# Patient Record
Sex: FEMALE | Race: WHITE | Employment: STUDENT | URBAN - METROPOLITAN AREA
[De-identification: names, ages, dates, MRNs, and addresses within clinical notes are randomized per-mention and may not be internally consistent; named-entity substitution may affect disease eponyms.]

---
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
## 2024-05-06 ENCOUNTER — OFFICE VISIT (OUTPATIENT)
Age: 19
End: 2024-05-06

## 2024-05-06 VITALS
DIASTOLIC BLOOD PRESSURE: 66 MMHG | OXYGEN SATURATION: 97 % | RESPIRATION RATE: 16 BRPM | HEART RATE: 89 BPM | BODY MASS INDEX: 21.97 KG/M2 | TEMPERATURE: 98.7 F | HEIGHT: 67 IN | WEIGHT: 140 LBS | SYSTOLIC BLOOD PRESSURE: 96 MMHG

## 2024-05-06 DIAGNOSIS — R30.0 DYSURIA: Primary | ICD-10-CM

## 2024-05-06 DIAGNOSIS — J02.9 SORE THROAT: ICD-10-CM

## 2024-05-06 LAB
BILIRUBIN, POC: ABNORMAL
BLOOD URINE, POC: NEGATIVE
CLARITY, POC: ABNORMAL
COLOR, POC: ABNORMAL
GLUCOSE URINE, POC: 100
KETONES, POC: ABNORMAL
LEUKOCYTE EST, POC: NEGATIVE
NITRITE, POC: POSITIVE
PH, POC: 5.5
PROTEIN, POC: 100
SPECIFIC GRAVITY, POC: 1.02
STREPTOCOCCUS A RNA: NEGATIVE
UROBILINOGEN, POC: 2

## 2024-05-06 RX ORDER — BROMPHENIRAMINE MALEATE, PSEUDOEPHEDRINE HYDROCHLORIDE, AND DEXTROMETHORPHAN HYDROBROMIDE 2; 30; 10 MG/5ML; MG/5ML; MG/5ML
5 SYRUP ORAL 4 TIMES DAILY PRN
Qty: 120 ML | Refills: 0 | Status: SHIPPED | OUTPATIENT
Start: 2024-05-06

## 2024-05-06 RX ORDER — SULFAMETHOXAZOLE AND TRIMETHOPRIM 800; 160 MG/1; MG/1
1 TABLET ORAL 2 TIMES DAILY
Qty: 14 TABLET | Refills: 0 | Status: SHIPPED | OUTPATIENT
Start: 2024-05-06 | End: 2024-05-13

## 2024-05-06 ASSESSMENT — ENCOUNTER SYMPTOMS
BACK PAIN: 0
VOMITING: 0
DIARRHEA: 0
SINUS PAIN: 0
COUGH: 1
RHINORRHEA: 0
SHORTNESS OF BREATH: 0
NAUSEA: 0
SORE THROAT: 1

## 2024-05-06 NOTE — PATIENT INSTRUCTIONS
Take medication as prescribed.   Rest and Increase fluids.    Try taking a daily antihistamine such as Claritin or Zyrtec.     Follow up with your PCP in the next 1 week.

## 2024-05-06 NOTE — PROGRESS NOTES
Day Ramirez (:  2005) is a 18 y.o. female,New patient, here for evaluation of the following chief complaint(s):  Cough, Pharyngitis (Room mate had strep ), and Dysuria (2 days )      ASSESSMENT/PLAN:    ICD-10-CM    1. Dysuria  R30.0 POCT Urinalysis no Micro     Culture, Urine     sulfamethoxazole-trimethoprim (BACTRIM DS;SEPTRA DS) 800-160 MG per tablet      2. Sore throat  J02.9 POCT Rapid Strep A DNA (Alere i)     brompheniramine-pseudoephedrine-DM 2-30-10 MG/5ML syrup          Take medication as prescribed.   Rest and Increase fluids.    Try taking a daily antihistamine such as Claritin or Zyrtec.     Follow up with your PCP in the next 1 week.     SUBJECTIVE/OBJECTIVE:    History provided by:  Patient   used: No    Cough  Associated symptoms include a sore throat. Pertinent negatives include no chills, ear pain, fever, headaches, myalgias, rash, rhinorrhea or shortness of breath.   Pharyngitis  Associated symptoms: cough and sore throat    Associated symptoms: no congestion, no diarrhea, no ear pain, no fatigue, no fever, no headaches, no myalgias, no nausea, no rash, no rhinorrhea, no shortness of breath and no vomiting    Dysuria   Associated symptoms include frequency. Pertinent negatives include no chills, flank pain, hematuria, nausea or vomiting.     HPI:   18 y.o. female presents with symptoms of dysuria, frequency ongoing since about 2 days. Has also been exposed to strep throat by roommate and had a cough. Denies fever, SOB, back pain, kidney stone hx. Has taken otc cough medication for symptoms with mild relief.    Vitals:    24 1430   BP: 96/66   Site: Left Upper Arm   Position: Sitting   Cuff Size: Medium Adult   Pulse: 89   Resp: 16   Temp: 98.7 °F (37.1 °C)   TempSrc: Oral   SpO2: 97%   Weight: 63.5 kg (140 lb)   Height: 1.702 m (5' 7\")       Review of Systems   Constitutional:  Negative for chills, fatigue and fever.   HENT:  Positive for sore throat.

## 2024-05-09 LAB
BACTERIA UR CULT: ABNORMAL
ORGANISM: ABNORMAL